# Patient Record
Sex: FEMALE | Employment: FULL TIME | URBAN - METROPOLITAN AREA
[De-identification: names, ages, dates, MRNs, and addresses within clinical notes are randomized per-mention and may not be internally consistent; named-entity substitution may affect disease eponyms.]

---

## 2020-06-05 ENCOUNTER — HOSPITAL ENCOUNTER (EMERGENCY)
Facility: CLINIC | Age: 45
Discharge: HOME | End: 2020-06-05
Attending: EMERGENCY MEDICINE | Admitting: EMERGENCY MEDICINE
Payer: COMMERCIAL

## 2020-06-05 ENCOUNTER — APPOINTMENT (OUTPATIENT)
Dept: GENERAL RADIOLOGY | Facility: CLINIC | Age: 45
End: 2020-06-05
Attending: EMERGENCY MEDICINE
Payer: COMMERCIAL

## 2020-06-05 VITALS
DIASTOLIC BLOOD PRESSURE: 79 MMHG | TEMPERATURE: 98.7 F | SYSTOLIC BLOOD PRESSURE: 125 MMHG | HEART RATE: 76 BPM | OXYGEN SATURATION: 99 % | RESPIRATION RATE: 16 BRPM

## 2020-06-05 DIAGNOSIS — R00.2 PALPITATIONS: ICD-10-CM

## 2020-06-05 DIAGNOSIS — R07.9 ACUTE CHEST PAIN: ICD-10-CM

## 2020-06-05 LAB
ANION GAP SERPL CALCULATED.3IONS-SCNC: 8 MMOL/L (ref 3–14)
BASOPHILS # BLD AUTO: 0 10E9/L (ref 0–0.2)
BASOPHILS NFR BLD AUTO: 0.3 %
BUN SERPL-MCNC: 15 MG/DL (ref 7–30)
CALCIUM SERPL-MCNC: 8.5 MG/DL (ref 8.5–10.1)
CHLORIDE SERPL-SCNC: 109 MMOL/L (ref 94–109)
CO2 SERPL-SCNC: 21 MMOL/L (ref 20–32)
CREAT SERPL-MCNC: 0.76 MG/DL (ref 0.52–1.04)
D DIMER PPP FEU-MCNC: <0.3 UG/ML FEU (ref 0–0.5)
DIFFERENTIAL METHOD BLD: ABNORMAL
EOSINOPHIL # BLD AUTO: 0.3 10E9/L (ref 0–0.7)
EOSINOPHIL NFR BLD AUTO: 3.1 %
ERYTHROCYTE [DISTWIDTH] IN BLOOD BY AUTOMATED COUNT: 17.2 % (ref 10–15)
GFR SERPL CREATININE-BSD FRML MDRD: >90 ML/MIN/{1.73_M2}
GLUCOSE SERPL-MCNC: 88 MG/DL (ref 70–99)
HCG SERPL QL: NEGATIVE
HCT VFR BLD AUTO: 34 % (ref 35–47)
HGB BLD-MCNC: 10.7 G/DL (ref 11.7–15.7)
IMM GRANULOCYTES # BLD: 0 10E9/L (ref 0–0.4)
IMM GRANULOCYTES NFR BLD: 0.2 %
INTERPRETATION ECG - MUSE: NORMAL
LYMPHOCYTES # BLD AUTO: 2.8 10E9/L (ref 0.8–5.3)
LYMPHOCYTES NFR BLD AUTO: 30.1 %
MAGNESIUM SERPL-MCNC: 2.3 MG/DL (ref 1.6–2.3)
MCH RBC QN AUTO: 25.8 PG (ref 26.5–33)
MCHC RBC AUTO-ENTMCNC: 31.5 G/DL (ref 31.5–36.5)
MCV RBC AUTO: 82 FL (ref 78–100)
MONOCYTES # BLD AUTO: 0.7 10E9/L (ref 0–1.3)
MONOCYTES NFR BLD AUTO: 7.7 %
NEUTROPHILS # BLD AUTO: 5.4 10E9/L (ref 1.6–8.3)
NEUTROPHILS NFR BLD AUTO: 58.6 %
NRBC # BLD AUTO: 0 10*3/UL
NRBC BLD AUTO-RTO: 0 /100
NT-PROBNP SERPL-MCNC: 23 PG/ML (ref 0–450)
PLATELET # BLD AUTO: 448 10E9/L (ref 150–450)
POTASSIUM SERPL-SCNC: 3.9 MMOL/L (ref 3.4–5.3)
RBC # BLD AUTO: 4.15 10E12/L (ref 3.8–5.2)
SODIUM SERPL-SCNC: 138 MMOL/L (ref 133–144)
TROPONIN I SERPL-MCNC: <0.015 UG/L (ref 0–0.04)
WBC # BLD AUTO: 9.2 10E9/L (ref 4–11)

## 2020-06-05 PROCEDURE — 99285 EMERGENCY DEPT VISIT HI MDM: CPT | Mod: 25

## 2020-06-05 PROCEDURE — 85379 FIBRIN DEGRADATION QUANT: CPT | Performed by: EMERGENCY MEDICINE

## 2020-06-05 PROCEDURE — 80048 BASIC METABOLIC PNL TOTAL CA: CPT | Performed by: EMERGENCY MEDICINE

## 2020-06-05 PROCEDURE — 83735 ASSAY OF MAGNESIUM: CPT | Performed by: EMERGENCY MEDICINE

## 2020-06-05 PROCEDURE — 84484 ASSAY OF TROPONIN QUANT: CPT | Performed by: EMERGENCY MEDICINE

## 2020-06-05 PROCEDURE — 84703 CHORIONIC GONADOTROPIN ASSAY: CPT | Performed by: EMERGENCY MEDICINE

## 2020-06-05 PROCEDURE — 71045 X-RAY EXAM CHEST 1 VIEW: CPT

## 2020-06-05 PROCEDURE — 93005 ELECTROCARDIOGRAM TRACING: CPT

## 2020-06-05 PROCEDURE — 85025 COMPLETE CBC W/AUTO DIFF WBC: CPT | Performed by: EMERGENCY MEDICINE

## 2020-06-05 PROCEDURE — 83880 ASSAY OF NATRIURETIC PEPTIDE: CPT | Performed by: EMERGENCY MEDICINE

## 2020-06-05 ASSESSMENT — ENCOUNTER SYMPTOMS
PALPITATIONS: 1
FEVER: 0
HEADACHES: 0

## 2020-06-05 NOTE — ED AVS SNAPSHOT
Emergency Department  [address] [facility] [address]  Phone:  [phone]  Fax:  [phone]                                    Brenda Urbina   MRN: [id number]    Department:   Emergency Department   Date of Visit:  6/5/2020           After Visit Summary Signature Page    I have received my discharge instructions, and my questions have been answered. I have discussed any challenges I see with this plan with the nurse or doctor.    ..........................................................................................................................................  Patient/Patient Representative Signature      ..........................................................................................................................................  Patient Representative Print Name and Relationship to Patient    ..................................................               ................................................  Date                                   Time    ..........................................................................................................................................  Reviewed by Signature/Title    ...................................................              ..............................................  Date                                               Time          22EPIC Rev 08/18

## 2020-06-05 NOTE — ED PROVIDER NOTES
History     Chief Complaint:  Palpitations    HPI   Brenda Urbina is a 44 year old female who presents with palpitations. The patient reports these palpitations beginning yesterday 6/4/2020. The patient states that there are 3 fast beats lasting seconds and then the palpitations go away for 3-5 minutes before returning. The patient also notes chest pain being present with these palpitations and a bad chest pain that has woken her in the middle of the night for the past few months. The patient notes feeling pain in both of her feet with swelling and pain on the right side of her neck.The patient denies fever, headache, pain in her calves, leg swelling and a history of blood clot or cancer. Of note, the patient reports a COVID diagnosis around 5/5/2020 but feels she has recovered from it.     Allergies:  No Known Allergies    Medications:    Prilosec    Past Medical History:    Human papilloma virus    Past Surgical History:    Appendectomy  Ganglion cyst removal (back of hand)  Postpartum tubal sterilization    Family History:    Lipids (Mother)    Social History:  Smoking status: Never  Alcohol use: Yes, socially  Drug use: No  PCP: Physician No Ref-Primary  Marital Status:   [2]    Review of Systems   Constitutional: Negative for fever.   Cardiovascular: Positive for chest pain and palpitations. Negative for leg swelling (feet swelling).   Neurological: Negative for headaches.   All other systems reviewed and are negative.      Physical Exam     Patient Vitals for the past 24 hrs:   BP Temp Temp src Pulse Resp SpO2   06/05/20 2100 125/79 -- -- 76 -- 99 %   06/05/20 2000 126/74 -- -- 82 -- 99 %   06/05/20 1900 123/76 -- -- 78 -- 98 %   06/05/20 1812 130/71 98.7  F (37.1  C) Temporal 91 16 99 %       Physical Exam  General: Well-nourished, appears to be resting comfortably when I enter the room  Eyes: PERRL, conjunctivae pink no scleral icterus or conjunctival injection  ENT:  Moist mucus membranes,  posterior oropharynx clear without erythema or exudates  Respiratory:  Lungs clear to auscultation bilaterally, no crackles/rubs/wheezes.  Good air movement  CV: Normal rate and rhythm, no murmurs/rubs/gallops  GI:  Abdomen soft and non-distended.  Normoactive BS.  No tenderness, guarding or rebound  Skin: Warm, dry.  No rashes or petechiae  Musculoskeletal: No peripheral edema or calf tenderness  Neuro: Alert and oriented to person/place/time  Psychiatric: Normal affect      Emergency Department Course   ECG (18:09:21):  Rate 83 bpm. MD interval 152. QRS duration 70. QT/QTc 374/439. P-R-T axes 31 42 38. Normal sinus rhythm. Nonspecific ST abnormality. Abnormal ECG. Interpreted at 1850 by Aylin Fletcher MD.     Imaging:  Radiographic findings were communicated with the patient who voiced understanding of the findings.    XR Chest Port 1 View  No acute disease.   As read by Radiology.    Laboratory:  CBC: HGB 10.7 (L) o/w WNL (WBC 9.2, )  BMP: WNL (Creatinine 0.76)  Troponin I (Collected 1845): <0.015  Magnesium: 2.3  Nt probnp: 23  D dimer: <0.3  HCG qualitative: Negative    Procedures:  None    Emergency Department Course:  Past medical records, nursing notes, and vitals reviewed.  1926: I performed an exam of the patient and obtained history, as documented above.  The patient was sent for a chest x-ray while in the emergency department, findings above.  EKG performed, results above.  IV inserted and blood drawn.    2152: I rechecked the patient.  Findings and plan explained to the Patient. Patient discharged home with instructions regarding supportive care, medications, and reasons to return. The importance of close follow-up was reviewed.     Impression & Plan      Medical Decision Making:  Brenda Urbina is a 44 year old female who had a COVID infection and has recovered from her symptoms of fever but comes now with some episodes of chest discomfort as well as palpitations.  Her EKG is normal nonischemic  here.  Her d-dimer is negative.  Her troponin is negative.  I do not believe this represents a pulmonary embolism, congestive heart failure, aortic abnormality or acute coronary syndrome.  Chest x-ray is reassuring.  Given the this is a novel coronavirus, we do not have all the natural course and symptoms characterized.  I suspect this may be related to her recent COVID infection.  Encourage her to drink plenty of water, avoid caffeinated food or beverages.  She is to follow-up with her own doctor in the next few days.  She is asked to return should she become worse in any way.      Diagnosis:    ICD-10-CM    1. Palpitations  R00.2 Zio Patch Holter Adult Pediatric Greater than 48 hrs   2. Acute chest pain  R07.9        Disposition:  discharged to home    Michelle Milligan  6/5/2020    EMERGENCY DEPARTMENT    IMichelle, am serving as a scribe at 7:26 PM on 6/5/2020 to document services personally performed by Aylin Fletcher MD based on my observations and the provider's statements to me.     IRoger, elier serving as a scribe at 7:51 PM on 6/5/2020 to document services personally performed by Aylin Fletcher MD based on my observations and the provider's statements to me.          Aylin Fletcher MD  06/07/20 2913

## 2020-06-06 NOTE — DISCHARGE INSTRUCTIONS
*You may resume diet and activities as tolerated.  Drink plenty of fluids.  Avoid caffeine and alcohol.  *Ibuprofen and/or tylenol as directed as needed for pain.  *Follow-up with your primary doctor in 2-3 days.  Follow-up for Holter monitor placement.  An order for this has been made in the computer system and you should get a call.  *Return if you become worse in any way.      Discharge Instructions  Palpitations    Palpitations are an unusual awareness of your heartbeat. People often describe this as the heart skipping, fluttering, racing, irregular, or pounding. At this time, your doctor has found no signs that your palpitations are due to a serious or life-threatening condition. However, sometimes there is a serious problem that does not show up right away. It is important that you follow up with your doctor within 1 week, or as directed by your doctor today, to check for other serious problems. You may need more blood tests, a stress test, heart monitoring, or other tests.    Palpitations can be caused by caffeine, cigarettes, diet pills, energy drinks or supplements, other stimulants, and medications and street drugs. They can also be caused by anxiety, hormone conditions such as high thyroid, and other medical conditions. Sometimes they are a sign of abnormal rhythm in the heart, so you may need your heart checked.    Return to the Emergency Department if:  You get chest pain or tightness.  You are short of breath.  You get very weak or tired.  You pass out or faint.  Your heart rate is over 120 beats per minute for more than 10 minutes while you are resting.  You have any new symptoms, like fever, cough, numb legs, or you cough up blood.  You have anything else that worries you.    What can I do to help myself?  Fill any prescriptions the doctor gave you and take them right away.   Follow your doctor s instructions about the prescription medicines you are on. Sometimes the doctor may tell you to stop  taking a medicine or change the dose.  If you smoke, this may be a good time to quit! The less you can smoke, the better.  Do not use energy drinks, diet pills, or stimulants. Limit your use of caffeine.    Follow up with your doctor:  Within 1 week, or sooner if instructed.  If you keep having palpitations.  If you need help to quit smoking.  If you were given a prescription for medicine here today, be sure to read all of the information (including the package insert) that comes with your prescription.  This will include important information about the medicine, its side effects, and any warnings that you need to know about.  The pharmacist who fills the prescription can provide more information and answer questions you may have about the medicine.  If you have questions or concerns that the pharmacist cannot address, please call or return to the Emergency Department.         Opioid Medication Information    Pain medications are among the most commonly prescribed medicines, so we are including this information for all our patients. If you did not receive pain medication or get a prescription for pain medicine, you can ignore it.     You may have been given a prescription for an opioid (narcotic) pain medicine and/or have received a pain medicine while here in the Emergency Department. These medicines can make you drowsy or impaired. You must not drive, operate dangerous equipment, or engage in any other dangerous activities while taking these medications. If you drive while taking these medications, you could be arrested for DUI, or driving under the influence. Do not drink any alcohol while you are taking these medications.     Opioid pain medications can cause addiction. If you have a history of chemical dependency of any type, you are at a higher risk of becoming addicted to pain medications.  Only take these prescribed medications to treat your pain when all other options have been tried. Take it for as short a  time and as few doses as possible. Store your pain pills in a secure place, as they are frequently stolen and provide a dangerous opportunity for children or visitors in your house to start abusing these powerful medications. We will not replace any lost or stolen medicine.  As soon as your pain is better, you should flush all your remaining medication.     Many prescription pain medications contain Tylenol  (acetaminophen), including Vicodin , Tylenol #3 , Norco , Lortab , and Percocet .  You should not take any extra pills of Tylenol  if you are using these prescription medications or you can get very sick.  Do not ever take more than 3000 mg of acetaminophen in any 24 hour period.    All opioids tend to cause constipation. Drink plenty of water and eat foods that have a lot of fiber, such as fruits, vegetables, prune juice, apple juice and high fiber cereal.  Take a laxative if you don t move your bowels at least every other day. Miralax , Milk of Magnesia, Colace , or Senna  can be used to keep you regular.      Remember that you can always come back to the Emergency Department if you are not able to see your regular doctor in the amount of time listed above, if you get any new symptoms, or if there is anything that worries you.    Discharge Instructions  Chest Pain    You have been seen today for chest pain or discomfort.  At this time, your doctor has found no signs that your chest pain is due to a serious or life-threatening condition, (or you have declined more testing and/or admission to the hospital). However, sometimes there is a serious problem that does not show up right away. Your evaluation today may not be complete and you may need further testing and evaluation.     You need to follow-up with your regular doctor within 3 days.    Return to the Emergency Department if:  Your chest pain changes, gets worse, starts to happen more often, or comes with less activity.  You are short of breath.  You get  very weak or tired.  You pass out or faint.  You have any new symptoms, like fever, cough, numb legs, or you cough up blood.  You have anything else that worries you.    Until you follow-up with your regular doctor please do the following:  Take one aspirin daily unless you have an allergy or are told not to by your doctor.  If a stress test appointment has been made, go to the appointment.  If you have questions, contact your regular doctor.    If your doctor today has told you to follow-up with your regular doctor, it is very important that you make an appointment with your clinic and go to the appointment.  If you do not follow-up with your primary doctor, it may result in missing an important development which could result in permanent injury or disability and/or lasting pain.  If there is any problem keeping your appointment, call your doctor or return to the Emergency Department.    If you were given a prescription for medicine here today, be sure to read all of the information (including the package insert) that comes with your prescription.  This will include important information about the medicine, its side effects, and any warnings that you need to know about.  The pharmacist who fills the prescription can provide more information and answer questions you may have about the medicine.  If you have questions or concerns that the pharmacist cannot address, please call or return to the Emergency Department.     Opioid Medication Information    Pain medications are among the most commonly prescribed medicines, so we are including this information for all our patients. If you did not receive pain medication or get a prescription for pain medicine, you can ignore it.     You may have been given a prescription for an opioid (narcotic) pain medicine and/or have received a pain medicine while here in the Emergency Department. These medicines can make you drowsy or impaired. You must not drive, operate dangerous  equipment, or engage in any other dangerous activities while taking these medications. If you drive while taking these medications, you could be arrested for DUI, or driving under the influence. Do not drink any alcohol while you are taking these medications.     Opioid pain medications can cause addiction. If you have a history of chemical dependency of any type, you are at a higher risk of becoming addicted to pain medications.  Only take these prescribed medications to treat your pain when all other options have been tried. Take it for as short a time and as few doses as possible. Store your pain pills in a secure place, as they are frequently stolen and provide a dangerous opportunity for children or visitors in your house to start abusing these powerful medications. We will not replace any lost or stolen medicine.  As soon as your pain is better, you should flush all your remaining medication.     Many prescription pain medications contain Tylenol  (acetaminophen), including Vicodin , Tylenol #3 , Norco , Lortab , and Percocet .  You should not take any extra pills of Tylenol  if you are using these prescription medications or you can get very sick.  Do not ever take more than 3000 mg of acetaminophen in any 24 hour period.    All opioids tend to cause constipation. Drink plenty of water and eat foods that have a lot of fiber, such as fruits, vegetables, prune juice, apple juice and high fiber cereal.  Take a laxative if you don t move your bowels at least every other day. Miralax , Milk of Magnesia, Colace , or Senna  can be used to keep you regular.      Remember that you can always come back to the Emergency Department if you are not able to see your regular doctor in the amount of time listed above, if you get any new symptoms, or if there is anything that worries you.

## 2020-06-12 ENCOUNTER — OFFICE VISIT (OUTPATIENT)
Dept: FAMILY MEDICINE | Facility: CLINIC | Age: 45
End: 2020-06-12
Payer: COMMERCIAL

## 2020-06-12 VITALS
SYSTOLIC BLOOD PRESSURE: 126 MMHG | TEMPERATURE: 97.6 F | HEIGHT: 64 IN | WEIGHT: 178.7 LBS | BODY MASS INDEX: 30.51 KG/M2 | HEART RATE: 80 BPM | OXYGEN SATURATION: 97 % | DIASTOLIC BLOOD PRESSURE: 78 MMHG

## 2020-06-12 DIAGNOSIS — R07.89 ATYPICAL CHEST PAIN: ICD-10-CM

## 2020-06-12 DIAGNOSIS — R00.2 PALPITATIONS: Primary | ICD-10-CM

## 2020-06-12 PROCEDURE — 84443 ASSAY THYROID STIM HORMONE: CPT | Performed by: INTERNAL MEDICINE

## 2020-06-12 PROCEDURE — 36415 COLL VENOUS BLD VENIPUNCTURE: CPT | Performed by: INTERNAL MEDICINE

## 2020-06-12 PROCEDURE — 99204 OFFICE O/P NEW MOD 45 MIN: CPT | Performed by: INTERNAL MEDICINE

## 2020-06-12 ASSESSMENT — MIFFLIN-ST. JEOR: SCORE: 1450.58

## 2020-06-12 NOTE — LETTER
[place] [id number]  [address]  [place]  [address]  Tel: [phone]    Daysi 15, 2020    Brenda Urbina  [address] [place] [address]        Dear Ms. Urbina,    The results of your recent TSH lab is OK .  If you have any further questions or problems, please contact our office.      Sincerely,    Pancho Hwang MD/LEONARDA          Enclosure: Lab Results  Results for orders placed or performed in visit on 06/12/20   TSH with free T4 reflex     Status: None   Result Value Ref Range    TSH 0.94 0.40 - 4.00 mU/L

## 2020-06-12 NOTE — PROGRESS NOTES
Subjective     Brenda Urbina is a 44 year old female who presents to clinic today for the following health issues:    ED/UC Followup:    Facility:   ED  Date of visit: 06/05/2020  Reason for visit:     Palpitations  Acute chest pain    Current Status: patient states that she is still having palpations       Chief Complaint:     Palpitations  Acute chest pain    HPI:     Atypical Chest Pain    Duration:     Since: recent           Specific cause: none    Description:      Location of pain: bilateral chest     Character of pain: dull     Pain radiation: none    Intensity: moderate    History:      Pain interferes with job: yes     History of similar pain problems: no     Any previous MRI or X-rays: no     Therapies tried without relief: none    Alleviating factors:      Improved by: none    Precipitating factors:    Worsened by: stress    Accompanying Signs & Symptoms: palpitations            Current Medications:     Current Outpatient Medications   Medication Sig Dispense Refill     diphenoxylate-atropine (LOMOTIL) 2.5-0.025 MG per tablet Take 2 tablets by mouth 4 times daily as needed for diarrhea 30 tablet 0     omeprazole (PRILOSEC) 40 MG capsule Take 1 capsule (40 mg) by mouth daily Take 30-60 minutes before a meal. 30 capsule 1     ondansetron (ZOFRAN ODT) 4 MG disintegrating tablet Take 1-2 tablets (4-8 mg) by mouth 3 times daily (before meals) 20 tablet 1         Allergies:    No Known Allergies         Past Medical History:     Past Medical History:   Diagnosis Date     Palpitation      Palpitations 6/12/2020         Past Surgical History:   No past surgical history on file.      Family Medical History:     Family History   Problem Relation Age of Onset     Cerebrovascular Disease Paternal Grandfather      Heart Disease Paternal Grandfather          Social History:     Social History     Socioeconomic History     Marital status:      Spouse name: Not on file     Number of children: Not on file      Years of education: Not on file     Highest education level: Not on file   Occupational History     Not on file   Social Needs     Financial resource strain: Not on file     Food insecurity     Worry: Not on file     Inability: Not on file     Transportation needs     Medical: Not on file     Non-medical: Not on file   Tobacco Use     Smoking status: Never Smoker     Smokeless tobacco: Never Used   Substance and Sexual Activity     Alcohol use: No     Drug use: No     Sexual activity: Not on file   Lifestyle     Physical activity     Days per week: Not on file     Minutes per session: Not on file     Stress: Not on file   Relationships     Social connections     Talks on phone: Not on file     Gets together: Not on file     Attends Religion service: Not on file     Active member of club or organization: Not on file     Attends meetings of clubs or organizations: Not on file     Relationship status: Not on file     Intimate partner violence     Fear of current or ex partner: Not on file     Emotionally abused: Not on file     Physically abused: Not on file     Forced sexual activity: Not on file   Other Topics Concern     Parent/sibling w/ CABG, MI or angioplasty before 65F 55M? Not Asked   Social History Narrative     Not on file           Review of System:     Constitutional: Negative for fever or chills  Skin: Negative for rashes  Ears/Nose/Throat: Negative for nasal congestion, sore throat  Respiratory: No shortness of breath, dyspnea on exertion, cough, or hemoptysis  Cardiovascular: Negative for chest pain in clinic today at this time. Positive for recent palpitations requiring ER visit.  Gastrointestinal: Negative for nausea, vomiting  Genitourinary: Negative for dysuria, hematuria  Musculoskeletal: Negative for myalgias  Neurologic: Negative for headaches  Psychiatric: Negative for depression, anxiety  Hematologic/Lymphatic/Immunologic: Negative  Endocrine: Negative  Behavioral: Negative for tobacco  "use       Physical Exam:   /78 (BP Location: Left arm, Patient Position: Sitting, Cuff Size: Adult Large)   Pulse 80   Temp 97.6  F (36.4  C) (Temporal)   Ht 1.626 m (5' 4\")   Wt 81.1 kg (178 lb 11.2 oz)   SpO2 97%   BMI 30.67 kg/m      GENERAL: alert and no distress  EYES: eyes grossly normal to inspection, and conjunctivae and sclerae normal  HENT: Normocephalic atraumatic. Nose and mouth without ulcers or lesions  NECK: supple  RESP: lungs clear to auscultation   CV: regular rate and rhythm, normal S1 S2  LYMPH: no peripheral edema   ABDOMEN: nondistended  MS: no gross musculoskeletal defects noted  SKIN: no suspicious lesions or rashes  NEURO: Alert & Oriented x 3.   PSYCH: mentation appears normal, affect normal        Diagnostic Test Results:     Lab Results   Component Value Date    WBC 9.2 06/05/2020     Lab Results   Component Value Date    RBC 4.15 06/05/2020     Lab Results   Component Value Date    HGB 10.7 06/05/2020     Lab Results   Component Value Date    HCT 34.0 06/05/2020     Lab Results   Component Value Date    MCV 82 06/05/2020     Lab Results   Component Value Date    MCH 25.8 06/05/2020     Lab Results   Component Value Date    MCHC 31.5 06/05/2020     Lab Results   Component Value Date    RDW 17.2 06/05/2020     Lab Results   Component Value Date     06/05/2020     Last Comprehensive Metabolic Panel:  Sodium   Date Value Ref Range Status   06/05/2020 138 133 - 144 mmol/L Final     Potassium   Date Value Ref Range Status   06/05/2020 3.9 3.4 - 5.3 mmol/L Final     Chloride   Date Value Ref Range Status   06/05/2020 109 94 - 109 mmol/L Final     Carbon Dioxide   Date Value Ref Range Status   06/05/2020 21 20 - 32 mmol/L Final     Anion Gap   Date Value Ref Range Status   06/05/2020 8 3 - 14 mmol/L Final     Glucose   Date Value Ref Range Status   06/05/2020 88 70 - 99 mg/dL Final     Urea Nitrogen   Date Value Ref Range Status   06/05/2020 15 7 - 30 mg/dL Final "     Creatinine   Date Value Ref Range Status   06/05/2020 0.76 0.52 - 1.04 mg/dL Final     GFR Estimate   Date Value Ref Range Status   06/05/2020 >90 >60 mL/min/[1.73_m2] Final     Comment:     Non  GFR Calc  Starting 12/18/2018, serum creatinine based estimated GFR (eGFR) will be   calculated using the Chronic Kidney Disease Epidemiology Collaboration   (CKD-EPI) equation.       Calcium   Date Value Ref Range Status   06/05/2020 8.5 8.5 - 10.1 mg/dL Final         ASSESSMENT/PLAN:     (R07.89) Atypical chest pain  (R00.2) Palpitations  (primary encounter diagnosis)  Comment: recent atypical chest pains with palpitations of unclear etiology. Patient was ruled out for acute coronary syndrome at the hospital ER.  Plan: CARDIO  ADULT REFERRAL, TSH with free         T4 reflex            Follow Up Plan:     Patient is instructed to return to Internal Medicine clinic for follow-up visit in 1 week.        Danna Hwang MD  Internal Medicine  [facility]

## 2020-06-13 LAB — TSH SERPL DL<=0.005 MIU/L-ACNC: 0.94 MU/L (ref 0.4–4)

## 2020-06-17 ENCOUNTER — VIRTUAL VISIT (OUTPATIENT)
Dept: CARDIOLOGY | Facility: CLINIC | Age: 45
End: 2020-06-17
Attending: INTERNAL MEDICINE
Payer: COMMERCIAL

## 2020-06-17 VITALS — WEIGHT: 178 LBS | BODY MASS INDEX: 30.55 KG/M2

## 2020-06-17 DIAGNOSIS — R00.2 PALPITATIONS: Primary | ICD-10-CM

## 2020-06-17 PROCEDURE — 99213 OFFICE O/P EST LOW 20 MIN: CPT | Mod: TEL | Performed by: INTERNAL MEDICINE

## 2020-06-17 NOTE — LETTER
6/17/2020    Physician No Ref-Primary  No address on file    RE: Brenda Urbina       Dear Colleague,    I had the pleasure of seeing Brenda Urbina in the [facility].    Brenda Urbina is a 44 year old female who is being evaluated via a billable telephone visit.        44-year-old lady asked to see us by Dr. Hwang for chest pain palpitation evaluation.  Patient denies both at this time.  She is also not short of breath.  No previous cardiac history.  No drug alcohol or tobacco abuse.  Family history negative for premature atherosclerotic disease.    Recent COVID infection diagnosed at [facility].  Was seen in the emergency room in June of this year at [facility].  At that time she had chest discomfort.  On further review this was a diffuse ache and nonexertional chest discomfort.  Troponins NT proBNP together with electrolytes are negative.  Of note is that hemoglobin is 10.7.  Last hemoglobin in 2015 was 12.7.  Chest x-ray was also negative.  EKG personally reviewed and is unremarkable.    Since that time shortness of breath chest pain have resolved.  She now feels a bit more fatigued particularly after she walks.    I think she may have had some musculoskeletal chest discomfort after her recent COVID infection.  I am happy to hear that this is resolved.  Her residual fatigue could be a post viral syndrome as well.  She is anemic and I have asked her to follow-up with Dr. Bullock for this condition.  No need for further cardiac work-up or follow-up at this time.    Phone call duration: 22 minutes    Thank you for allowing me to participate in the care of your patient.    Sincerely,     DR ARPITA RENDON MD     [facility]

## 2020-06-17 NOTE — PROGRESS NOTES
"Brenda Urbina is a 44 year old female who is being evaluated via a billable telephone visit.      The patient has been notified of following:     \"This telephone visit will be conducted via a call between you and your physician/provider. We have found that certain health care needs can be provided without the need for a physical exam.  This service lets us provide the care you need with a short phone conversation.  If a prescription is necessary we can send it directly to your pharmacy.  If lab work is needed we can place an order for that and you can then stop by our lab to have the test done at a later time.    Telephone visits are billed at different rates depending on your insurance coverage. During this emergency period, for some insurers they may be billed the same as an in-person visit.  Please reach out to your insurance provider with any questions.    If during the course of the call the physician/provider feels a telephone visit is not appropriate, you will not be charged for this service.\"    Patient has given verbal consent for Telephone visit?  Yes    What phone number would you like to be contacted at?  Services [phone] Telugu  Pt number [phone]    How would you like to obtain your AVS? Mail a copy     Review Of Systems  Skin: negative  Eyes: negative  Ears/Nose/Throat: negative  Respiratory: No shortness of breath, dyspnea on exertion, cough, or hemoptysis  Cardiovascular: palpitations, chest pain and Lots of tiredness since testing positive with COVID on May 5th 2020 had chest heaviness but not in the last week. 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 Hwang for chest pain palpitation evaluation.  Patient denies both at this time.  She is also not short of breath.  No previous cardiac history.  No drug alcohol or tobacco abuse.  Family history negative for premature atherosclerotic disease.    Recent COVID infection diagnosed at [facility].  Was seen in the emergency room in June of this year at [facility].  At that time she had chest discomfort.  On further review this was a diffuse ache and nonexertional chest discomfort.  Troponins NT proBNP together with electrolytes are negative.  Of note is that hemoglobin is 10.7.  Last hemoglobin in 2015 was 12.7.  Chest x-ray was also negative.  EKG personally reviewed and is unremarkable.    Since that time shortness of breath chest pain have resolved.  She now feels a bit more fatigued particularly after she walks.    I think she may have had some musculoskeletal chest discomfort after her recent COVID infection.  I am happy to hear that this is resolved.  Her residual fatigue could be a post viral syndrome as well.  She is anemic and I have asked her to follow-up with Dr. Bullock for this condition.  No need for further cardiac work-up or follow-up at this time.      Phone call duration: 22 minutes    DR ARPITA RENDON MD